# Patient Record
Sex: MALE | Race: WHITE | NOT HISPANIC OR LATINO
[De-identification: names, ages, dates, MRNs, and addresses within clinical notes are randomized per-mention and may not be internally consistent; named-entity substitution may affect disease eponyms.]

---

## 2021-03-23 ENCOUNTER — APPOINTMENT (OUTPATIENT)
Dept: HEART AND VASCULAR | Facility: CLINIC | Age: 76
End: 2021-03-23
Payer: MEDICARE

## 2021-03-23 VITALS
DIASTOLIC BLOOD PRESSURE: 80 MMHG | TEMPERATURE: 96.8 F | WEIGHT: 189 LBS | SYSTOLIC BLOOD PRESSURE: 130 MMHG | HEART RATE: 82 BPM | BODY MASS INDEX: 27.99 KG/M2 | OXYGEN SATURATION: 97 % | HEIGHT: 69 IN

## 2021-03-23 DIAGNOSIS — Z86.79 PERSONAL HISTORY OF OTHER DISEASES OF THE CIRCULATORY SYSTEM: ICD-10-CM

## 2021-03-23 DIAGNOSIS — I49.5 SICK SINUS SYNDROME: ICD-10-CM

## 2021-03-23 DIAGNOSIS — I45.5 OTHER SPECIFIED HEART BLOCK: ICD-10-CM

## 2021-03-23 DIAGNOSIS — Z86.39 PERSONAL HISTORY OF OTHER ENDOCRINE, NUTRITIONAL AND METABOLIC DISEASE: ICD-10-CM

## 2021-03-23 PROBLEM — Z00.00 ENCOUNTER FOR PREVENTIVE HEALTH EXAMINATION: Status: ACTIVE | Noted: 2021-03-23

## 2021-03-23 PROCEDURE — 93280 PM DEVICE PROGR EVAL DUAL: CPT

## 2021-03-23 RX ORDER — RIVAROXABAN 20 MG/1
20 TABLET, FILM COATED ORAL
Refills: 0 | Status: ACTIVE | COMMUNITY

## 2021-03-23 RX ORDER — LISINOPRIL 40 MG/1
40 TABLET ORAL
Refills: 0 | Status: ACTIVE | COMMUNITY

## 2021-03-23 RX ORDER — MULTIVITAMIN
TABLET ORAL
Refills: 0 | Status: ACTIVE | COMMUNITY

## 2021-03-23 RX ORDER — ATORVASTATIN CALCIUM 10 MG/1
10 TABLET, FILM COATED ORAL
Refills: 0 | Status: ACTIVE | COMMUNITY

## 2021-03-23 NOTE — HISTORY OF PRESENT ILLNESS
[de-identified] : He noted some left arm swelling shortly after, but that has resolved.  Denies fever or chills.  No reported dizziness or syncope.  He is monitoring his BP with wrist cuff.  BP in office today is lower than his home measurements.

## 2021-03-23 NOTE — PHYSICAL EXAM
[Left Infraclavicular] : left infraclavicular area [Clean] : clean [Dry] : dry [Healing Well] : healing well [Bleeding] : no active bleeding

## 2021-03-23 NOTE — PROCEDURE
[No] : not [Sinus Bradycardia] : sinus bradycardia [Pacemaker] : pacemaker [AAIR] : AAIR [Threshold Testing Performed] : Threshold testing was performed [None] : none [de-identified] : [employer] [de-identified] : Normal pacemaker function. Two episodes of reported NSVT c/w ?junctional tachycardia

## 2021-03-23 NOTE — REASON FOR VISIT
[Follow-up Device Check] : is here today for a follow-up device check visit for [Other ___] : [unfilled] [Spouse] : spouse